# Patient Record
Sex: MALE | Race: BLACK OR AFRICAN AMERICAN | Employment: OTHER | URBAN - METROPOLITAN AREA
[De-identification: names, ages, dates, MRNs, and addresses within clinical notes are randomized per-mention and may not be internally consistent; named-entity substitution may affect disease eponyms.]

---

## 2017-11-15 ENCOUNTER — HOSPITAL ENCOUNTER (EMERGENCY)
Age: 57
Discharge: HOME | End: 2017-11-15
Attending: EMERGENCY MEDICINE | Admitting: EMERGENCY MEDICINE
Payer: OTHER GOVERNMENT

## 2017-11-15 ENCOUNTER — APPOINTMENT (OUTPATIENT)
Dept: GENERAL RADIOLOGY | Age: 57
End: 2017-11-15
Attending: PHYSICIAN ASSISTANT
Payer: OTHER GOVERNMENT

## 2017-11-15 VITALS
HEART RATE: 120 BPM | BODY MASS INDEX: 35.36 KG/M2 | SYSTOLIC BLOOD PRESSURE: 167 MMHG | TEMPERATURE: 101.7 F | OXYGEN SATURATION: 96 % | WEIGHT: 220 LBS | RESPIRATION RATE: 16 BRPM | DIASTOLIC BLOOD PRESSURE: 79 MMHG | HEIGHT: 66 IN

## 2017-11-15 DIAGNOSIS — E11.65 TYPE 2 DIABETES MELLITUS WITH HYPERGLYCEMIA, WITH LONG-TERM CURRENT USE OF INSULIN (HCC): ICD-10-CM

## 2017-11-15 DIAGNOSIS — J18.9 COMMUNITY ACQUIRED PNEUMONIA OF RIGHT LOWER LOBE OF LUNG: Primary | ICD-10-CM

## 2017-11-15 DIAGNOSIS — Z79.4 TYPE 2 DIABETES MELLITUS WITH HYPERGLYCEMIA, WITH LONG-TERM CURRENT USE OF INSULIN (HCC): ICD-10-CM

## 2017-11-15 LAB
ALBUMIN SERPL-MCNC: 3.3 G/DL (ref 3.4–5)
ALBUMIN/GLOB SERPL: 0.8 {RATIO} (ref 0.8–1.7)
ALP SERPL-CCNC: 143 U/L (ref 45–117)
ALT SERPL-CCNC: 50 U/L (ref 16–61)
ANION GAP SERPL CALC-SCNC: 8 MMOL/L (ref 3–18)
AST SERPL-CCNC: 34 U/L (ref 15–37)
ATRIAL RATE: 120 BPM
BASOPHILS # BLD: 0 K/UL (ref 0–0.06)
BASOPHILS NFR BLD: 0 % (ref 0–2)
BILIRUB SERPL-MCNC: 0.8 MG/DL (ref 0.2–1)
BUN SERPL-MCNC: 13 MG/DL (ref 7–18)
BUN/CREAT SERPL: 9 (ref 12–20)
CALCIUM SERPL-MCNC: 8.6 MG/DL (ref 8.5–10.1)
CALCULATED P AXIS, ECG09: 44 DEGREES
CALCULATED R AXIS, ECG10: -16 DEGREES
CALCULATED T AXIS, ECG11: 57 DEGREES
CHLORIDE SERPL-SCNC: 100 MMOL/L (ref 100–108)
CO2 SERPL-SCNC: 29 MMOL/L (ref 21–32)
CREAT SERPL-MCNC: 1.47 MG/DL (ref 0.6–1.3)
DIAGNOSIS, 93000: NORMAL
DIFFERENTIAL METHOD BLD: ABNORMAL
EOSINOPHIL # BLD: 0 K/UL (ref 0–0.4)
EOSINOPHIL NFR BLD: 0 % (ref 0–5)
ERYTHROCYTE [DISTWIDTH] IN BLOOD BY AUTOMATED COUNT: 14 % (ref 11.6–14.5)
GLOBULIN SER CALC-MCNC: 4 G/DL (ref 2–4)
GLUCOSE BLD STRIP.AUTO-MCNC: 236 MG/DL (ref 70–110)
GLUCOSE BLD STRIP.AUTO-MCNC: 296 MG/DL (ref 70–110)
GLUCOSE SERPL-MCNC: 310 MG/DL (ref 74–99)
HCT VFR BLD AUTO: 36.8 % (ref 36–48)
HGB BLD-MCNC: 12.6 G/DL (ref 13–16)
LACTATE SERPL-SCNC: 1.1 MMOL/L (ref 0.4–2)
LACTATE SERPL-SCNC: 2.6 MMOL/L (ref 0.4–2)
LYMPHOCYTES # BLD: 0.2 K/UL (ref 0.9–3.6)
LYMPHOCYTES NFR BLD: 2 % (ref 21–52)
MCH RBC QN AUTO: 26.8 PG (ref 24–34)
MCHC RBC AUTO-ENTMCNC: 34.2 G/DL (ref 31–37)
MCV RBC AUTO: 78.1 FL (ref 74–97)
MONOCYTES # BLD: 0.5 K/UL (ref 0.05–1.2)
MONOCYTES NFR BLD: 5 % (ref 3–10)
MYELOCYTES NFR BLD MANUAL: 1 %
NEUTS BAND NFR BLD MANUAL: 4 %
NEUTS SEG # BLD: 8.7 K/UL (ref 1.8–8)
NEUTS SEG NFR BLD: 88 % (ref 40–73)
P-R INTERVAL, ECG05: 122 MS
PLATELET # BLD AUTO: 257 K/UL (ref 135–420)
PLATELET COMMENTS,PCOM: ABNORMAL
PMV BLD AUTO: 10.8 FL (ref 9.2–11.8)
POTASSIUM SERPL-SCNC: 4 MMOL/L (ref 3.5–5.5)
PROT SERPL-MCNC: 7.3 G/DL (ref 6.4–8.2)
Q-T INTERVAL, ECG07: 336 MS
QRS DURATION, ECG06: 78 MS
QTC CALCULATION (BEZET), ECG08: 474 MS
RBC # BLD AUTO: 4.71 M/UL (ref 4.7–5.5)
RBC MORPH BLD: ABNORMAL
SODIUM SERPL-SCNC: 137 MMOL/L (ref 136–145)
TROPONIN I SERPL-MCNC: <0.02 NG/ML (ref 0–0.06)
VENTRICULAR RATE, ECG03: 120 BPM
WBC # BLD AUTO: 9.9 K/UL (ref 4.6–13.2)

## 2017-11-15 PROCEDURE — 83605 ASSAY OF LACTIC ACID: CPT | Performed by: PHYSICIAN ASSISTANT

## 2017-11-15 PROCEDURE — 85025 COMPLETE CBC W/AUTO DIFF WBC: CPT | Performed by: PHYSICIAN ASSISTANT

## 2017-11-15 PROCEDURE — 82962 GLUCOSE BLOOD TEST: CPT

## 2017-11-15 PROCEDURE — 80053 COMPREHEN METABOLIC PANEL: CPT | Performed by: PHYSICIAN ASSISTANT

## 2017-11-15 PROCEDURE — 74011250636 HC RX REV CODE- 250/636: Performed by: PHYSICIAN ASSISTANT

## 2017-11-15 PROCEDURE — 74011250637 HC RX REV CODE- 250/637: Performed by: PHYSICIAN ASSISTANT

## 2017-11-15 PROCEDURE — 74011000250 HC RX REV CODE- 250: Performed by: PHYSICIAN ASSISTANT

## 2017-11-15 PROCEDURE — 87040 BLOOD CULTURE FOR BACTERIA: CPT | Performed by: PHYSICIAN ASSISTANT

## 2017-11-15 PROCEDURE — 96361 HYDRATE IV INFUSION ADD-ON: CPT

## 2017-11-15 PROCEDURE — 99284 EMERGENCY DEPT VISIT MOD MDM: CPT

## 2017-11-15 PROCEDURE — 84484 ASSAY OF TROPONIN QUANT: CPT | Performed by: PHYSICIAN ASSISTANT

## 2017-11-15 PROCEDURE — 71010 XR CHEST PORT: CPT

## 2017-11-15 PROCEDURE — 93005 ELECTROCARDIOGRAM TRACING: CPT

## 2017-11-15 PROCEDURE — 96375 TX/PRO/DX INJ NEW DRUG ADDON: CPT

## 2017-11-15 PROCEDURE — 96365 THER/PROPH/DIAG IV INF INIT: CPT

## 2017-11-15 RX ORDER — AZITHROMYCIN 250 MG/1
TABLET, FILM COATED ORAL
Qty: 6 TAB | Refills: 0 | Status: SHIPPED | OUTPATIENT
Start: 2017-11-15 | End: 2017-11-20

## 2017-11-15 RX ORDER — IBUPROFEN 400 MG/1
800 TABLET ORAL
Status: COMPLETED | OUTPATIENT
Start: 2017-11-15 | End: 2017-11-15

## 2017-11-15 RX ORDER — ACETAMINOPHEN 325 MG/1
975 TABLET ORAL
Status: COMPLETED | OUTPATIENT
Start: 2017-11-15 | End: 2017-11-15

## 2017-11-15 RX ORDER — ONDANSETRON 2 MG/ML
4 INJECTION INTRAMUSCULAR; INTRAVENOUS
Status: COMPLETED | OUTPATIENT
Start: 2017-11-15 | End: 2017-11-15

## 2017-11-15 RX ADMIN — IBUPROFEN 800 MG: 400 TABLET, FILM COATED ORAL at 10:58

## 2017-11-15 RX ADMIN — SODIUM CHLORIDE 500 MG: 900 INJECTION, SOLUTION INTRAVENOUS at 09:51

## 2017-11-15 RX ADMIN — ONDANSETRON 4 MG: 2 INJECTION INTRAMUSCULAR; INTRAVENOUS at 08:55

## 2017-11-15 RX ADMIN — SODIUM CHLORIDE 1000 ML: 900 INJECTION, SOLUTION INTRAVENOUS at 08:23

## 2017-11-15 RX ADMIN — SODIUM CHLORIDE 2994 ML: 900 INJECTION, SOLUTION INTRAVENOUS at 09:57

## 2017-11-15 RX ADMIN — ACETAMINOPHEN 975 MG: 325 TABLET ORAL at 08:55

## 2017-11-15 RX ADMIN — WATER 1 G: 1 INJECTION INTRAMUSCULAR; INTRAVENOUS; SUBCUTANEOUS at 09:45

## 2017-11-15 NOTE — ED NOTES
Sepsis Screening completed    ( x )Patient meets SIRS criteria. (  )Patient does not meet SIRS criteria.       SIRS Criteria is achieved when two or more of the following are present   Temperature < 96.8°F (36°C) or > 100.9°F (38.3°C)   Heart Rate > 90 beats per minute   Respiratory Rate > 20 breaths per minute   WBC count > 12,000 or <4,000 or > 10% bands

## 2017-11-15 NOTE — ED PROVIDER NOTES
[address]  EMERGENCY DEPARTMENT HISTORY AND PHYSICAL EXAM       Date: 11/15/2017   Patient Name: Merritt Gosselin   YOB: 1960  Medical Record Number: [id number]    History of Presenting Illness     Chief Complaint   Patient presents with    Abdominal Pain    Nausea    Dizziness        History Provided By:  Patient and spouse    Additional History: 8:12 AM  Merritt Gosselin is a 62 y.o. male with PMHx of diabetes and HTN presenting to the ED C/O nausea, vomiting, 2 episodes of nonbloody diarrhea, cough, chest congestion, dizziness with movement, and tactile fever (103 F in ED). Pt states he was dx'ed with vertigo 3-4 months ago at the [address] and is currently taking Meclizine. Pt states he \"had a cold\" and after taking TheraFlu. . Pt states he ate [place] food (chicken) last night. States his daughter had slight abdominal pain after eating it, but no one else experienced these symptoms. Pt reports his granddaughter had a cough last few days and he has been around her. Pt denies bloody/black stool, visual disturbance, HA, sore throat, and any other symptoms or complaints at this time. Primary Care Provider: Elisa Carroll MD   Specialist:    Past History     Past Medical History:   Past Medical History:   Diagnosis Date    Diabetes ([address]     Hypertension         Past Surgical History:   History reviewed. No pertinent surgical history. Family History:   History reviewed. No pertinent family history. Social History:   Social History   Substance Use Topics    Smoking status: None    Smokeless tobacco: None    Alcohol use None        Allergies:   No Known Allergies     Review of Systems   Review of Systems   Constitutional: Positive for fever (tactile). Negative for chills. HENT: Negative for sore throat. Eyes: Negative for visual disturbance. Respiratory: Positive for cough and chest tightness.     Gastrointestinal: Positive for abdominal pain, diarrhea and vomiting. Neurological: Positive for dizziness. Negative for headaches. All other systems reviewed and are negative. Physical Exam  Vitals:    11/15/17 0915 11/15/17 0930 11/15/17 0945 11/15/17 0955   BP: 170/82 174/76 167/79    Pulse:       Resp:       Temp:    (!) 101.7 °F (38.7 °C)   SpO2: 98% 96% 96%    Weight:       Height:           Physical Exam   Nursing note and vitals reviewed. Vital signs and nursing notes reviewed. CONSTITUTIONAL: Alert. Well-appearing; well-nourished; in no apparent distress. HEAD: Normocephalic; atraumatic. EYES: PERRL; EOM's intact. No nystagmus. Conjunctiva clear. ENT: TM's normal. External ear normal. Normal nose; no rhinorrhea. Normal pharynx. Moist mucus membranes. NECK: Supple; FROM without difficulty. CV: Tachycardic, regular S1, S2; no murmurs, rubs, or gallops. No chest wall tenderness. RESPIRATORY: Normal chest excursion with respiration; breath sounds clear and equal bilaterally; no wheezes, rhonchi, or rales. GI: Normal bowel sounds; non-distended; non-tender; no guarding or rigidity; no palpable organomegaly. No CVA tenderness. BACK:  No evidence of trauma or deformity. EXT: Normal ROM in all four extremities; non-tender to palpation. SKIN: Normal for age and race; warm; dry; good turgor; no apparent lesions or exudate. NEURO: A & O x3. Cranial nerves 2-12 intact. Motor 5/5 bilaterally. Sensation intact. PSYCH:  Mood and affect appropriate.        Diagnostic Study Results     Labs -      Recent Results (from the past 12 hour(s))   CBC WITH AUTOMATED DIFF    Collection Time: 11/15/17  8:35 AM   Result Value Ref Range    WBC 9.9 4.6 - 13.2 K/uL    RBC 4.71 4.70 - 5.50 M/uL    HGB 12.6 (L) 13.0 - 16.0 g/dL    HCT 36.8 36.0 - 48.0 %    MCV 78.1 74.0 - 97.0 FL    MCH 26.8 24.0 - 34.0 PG    MCHC 34.2 31.0 - 37.0 g/dL    RDW 14.0 11.6 - 14.5 %    PLATELET 922 468 - 047 K/uL    MPV 10.8 9.2 - 11.8 FL    NEUTROPHILS 88 (H) 40 - 73 %    BAND NEUTROPHILS 4 %    LYMPHOCYTES 2 (L) 21 - 52 %    MONOCYTES 5 3 - 10 %    EOSINOPHILS 0 0 - 5 %    BASOPHILS 0 0 - 2 %    MYELOCYTES 1 %    ABS. NEUTROPHILS 8.7 (H) 1.8 - 8.0 K/UL    ABS. LYMPHOCYTES 0.2 (L) 0.9 - 3.6 K/UL    ABS. MONOCYTES 0.5 0.05 - 1.2 K/UL    ABS. EOSINOPHILS 0.0 0.0 - 0.4 K/UL    ABS. BASOPHILS 0.0 0.0 - 0.06 K/UL    PLATELET COMMENTS LARGE PLATELETS      RBC COMMENTS ANISOCYTOSIS  1+        DF MANUAL     METABOLIC PANEL, COMPREHENSIVE    Collection Time: 11/15/17  8:35 AM   Result Value Ref Range    Sodium 137 136 - 145 mmol/L    Potassium 4.0 3.5 - 5.5 mmol/L    Chloride 100 100 - 108 mmol/L    CO2 29 21 - 32 mmol/L    Anion gap 8 3.0 - 18 mmol/L    Glucose 310 (H) 74 - 99 mg/dL    BUN 13 7.0 - 18 MG/DL    Creatinine 1.47 (H) 0.6 - 1.3 MG/DL    BUN/Creatinine ratio 9 (L) 12 - 20      GFR est AA 60 (L) >60 ml/min/1.73m2    GFR est non-AA 49 (L) >60 ml/min/1.73m2    Calcium 8.6 8.5 - 10.1 MG/DL    Bilirubin, total 0.8 0.2 - 1.0 MG/DL    ALT (SGPT) 50 16 - 61 U/L    AST (SGOT) 34 15 - 37 U/L    Alk.  phosphatase 143 (H) 45 - 117 U/L    Protein, total 7.3 6.4 - 8.2 g/dL    Albumin 3.3 (L) 3.4 - 5.0 g/dL    Globulin 4.0 2.0 - 4.0 g/dL    A-G Ratio 0.8 0.8 - 1.7     TROPONIN I    Collection Time: 11/15/17  8:35 AM   Result Value Ref Range    Troponin-I, Qt. <0.02 0.00 - 0.06 NG/ML   LACTIC ACID    Collection Time: 11/15/17  8:35 AM   Result Value Ref Range    Lactic acid 2.6 (HH) 0.4 - 2.0 MMOL/L   EKG, 12 LEAD, INITIAL    Collection Time: 11/15/17  8:52 AM   Result Value Ref Range    Ventricular Rate 120 BPM    Atrial Rate 120 BPM    P-R Interval 122 ms    QRS Duration 78 ms    Q-T Interval 336 ms    QTC Calculation (Bezet) 474 ms    Calculated P Axis 44 degrees    Calculated R Axis -16 degrees    Calculated T Axis 57 degrees    Diagnosis       Sinus tachycardia  Possible Left atrial enlargement  Nonspecific T wave abnormality  Abnormal ECG  When compared with ECG of 26-FEB-2010 04:27,  No significant change was found     GLUCOSE, POC    Collection Time: 11/15/17  9:00 AM   Result Value Ref Range    Glucose (POC) 296 (H) 70 - 110 mg/dL   LACTIC ACID    Collection Time: 11/15/17 12:33 PM   Result Value Ref Range    Lactic acid 1.1 0.4 - 2.0 MMOL/L   GLUCOSE, POC    Collection Time: 11/15/17  1:38 PM   Result Value Ref Range    Glucose (POC) 236 (H) 70 - 110 mg/dL       Radiologic Studies -  The following have been ordered and reviewed:   XR CHEST PORT   Final Result   Impression:  Underexpanded lungs with asymmetric opacity over the right mid and lower lung  which may represent atelectasis or infiltrate. As read by the radiologist.       Medical Decision Making   I am the first provider for this patient. I reviewed the vital signs, available nursing notes, past medical history, past surgical history, family history and social history. Vital Signs-Reviewed the patient's vital signs. Patient Vitals for the past 12 hrs:   Temp Pulse Resp BP SpO2   11/15/17 0955 (!) 101.7 °F (38.7 °C) - - - -   11/15/17 0945 - - - 167/79 96 %   11/15/17 0930 - - - 174/76 96 %   11/15/17 0915 - - - 170/82 98 %   11/15/17 0908 - - - - 100 %   11/15/17 0845 - - - 152/80 94 %   11/15/17 0839 (!) 103 °F (39.4 °C) (!) 120 16 162/83 99 %   11/15/17 0830 - - - 163/77 97 %   11/15/17 0828 - - - 162/83 -       Pulse Oximetry Analysis - Normal 99% on RA. EKG interpretation: (Preliminary)  Rhythm: Sinus tachycardia. Rate (approx.): 120 bpm; Possible left atrial enlargement. Nonspecific T wave abnormality. EKG read by Marivel Koenig PA-C at 8:5 AM    Old Medical Records: Nursing notes. Procedures:   Procedures    ED Course:  8:12 AM  Initial assessment performed. The patients presenting problems have been discussed, and they are in agreement with the care plan formulated and outlined with them. I have encouraged them to ask questions as they arise throughout their visit.     CONSULT NOTE:   8:52 AM  Lenoria Hamman Pravin Matthew PA-C spoke with Stephanie Cordova MD   Specialty: ED Attending  Discussed pt's hx, disposition, and available diagnostic and imaging results in person. Reviewed care plans. Consulting physician agrees with plan. FACE-TO-FACE PROGRESS NOTE:  10:03 AM  Was requested to see pt by the [facility]. Evaluated pt face-to-face. 2 days cough, NV/ fever. Improving with IV fluids. Small PNA on CXR. Will re-evaluation after tx for OP vs. IP pt of community acquired PNA. Written by Jay Brannon, ED Scribe, as dictated by Stephanie Cordova MD.     PROGRESS NOTE:   10:57 AM  Pt has been re-examined by Waylon Capellan PA-C. Pt is feeling \"alright. \" Resting conformably. His fever has improved from 103F to 100.5F. O2 stats 96% on RA. Still tachycardic at 114 bpm. Respiratory rate is 16. Most recent b/p is 145/74. He has completed 2 L of normal saline. His 30 cc/kg target fluid equals just under 3 L's. PROGRESS NOTE:   1:28 PM  Pt has been re-examined by Waylon Capellan PA-C. Pt states he is feeling much better. Feels like himself. States he wants to go home. His repeat lactic acid is 1.1. His CURB score is 0; low risk. He has good follow up through the [place] and his wife will be with him. He is afebrile. O2 stats 97% on RA. Still mildly tachycardic, not hypoxic or tachypnic. Blood pressures have been fine while in ED; not hypotensive. Risk and benefits of admission discussed but he looks well, is reliable and wants to go home. Will discharge with antibiotics and strict return precautions.      Medications Given in the ED:  Medications   sodium chloride 0.9 % bolus infusion 1,000 mL (0 mL IntraVENous IV Completed 11/15/17 1003)   ondansetron (ZOFRAN) injection 4 mg (4 mg IntraVENous Given 11/15/17 0855)   acetaminophen (TYLENOL) tablet 975 mg (975 mg Oral Given 11/15/17 0855)   azithromycin (ZITHROMAX) 500 mg in 0.9% sodium chloride (MBP/ADV) 250 mL adv (0 mg IntraVENous IV Completed 11/15/17 1051)   sodium chloride 0.9 % bolus infusion 2,994 mL (0 mL/kg × 99.8 kg IntraVENous IV Completed 11/15/17 1247)   cefTRIAXone (ROCEPHIN) 1 g in sterile water (preservative free) 10 mL IV syringe (1 g IntraVENous Given 11/15/17 0718)   ibuprofen (MOTRIN) tablet 800 mg (800 mg Oral Given 11/15/17 1058)       Discharge Note:  1:33 PM  Pt has been reexamined. Patient has no new complaints, changes, or physical findings. Care plan outlined and precautions discussed. Results were reviewed with the patient. All medications were reviewed with the patient; will d/c home with Zithromax Z-Antwan. All of pt's questions and concerns were addressed. Patient was instructed and agrees to follow up with [address] [place] [address], as well as to return to the ED upon further deterioration. Patient is ready to go home. Diagnosis   Clinical Impression:   1. Community acquired pneumonia of right lower lobe of lung ([facility] [address] [facility]    2. Type 2 diabetes mellitus with hyperglycemia, with long-term current use of insulin ([facility] [address] [facility]         Follow-up Information     Follow up With Details Comments [address] [place] [address] Schedule an appointment as soon as possible for a visit in 3 days For primary care follow up. [facility] [address] [place] [address] EMERGENCY DEPT Go to As needed, If symptoms worsen [address] [id number]  [phone]          Discharge Medication List as of 11/15/2017  1:33 PM      START taking these medications    Details   azithromycin (ZITHROMAX Z-ANTWAN) 250 mg tablet Use per pack instructions. , Print, Disp-6 Tab, R-0         CONTINUE these medications which have NOT CHANGED    Details   LISINOPRIL PO Take  by mouth. Pt did not bring med list or bottles, Historical Med      INSULIN ASPART (NOVOLOG SC) by SubCUTAneous route. Pt did not bring med list or bottles with him, Historical Med      INSULIN GLARGINE,HUM. REC. ANLOG (LANTUS SC) by SubCUTAneous route.  Pt did not bring med list or bottles with him, Historical Med             Critical Care Time:  I have spent 35 minutes of critical care time involved in lab review, consultations with specialist, family decision-making, and documentation. During this entire length of time I was immediately available to the patient. Critical Care: The reason for providing this level of medical care for this critically ill patient was due a critical illness that impaired one or more vital organ systems such that there was a high probability of imminent or life threatening deterioration in the patients condition. This care involved high complexity decision making to assess, manipulate, and support vital system functions, to treat this degreee vital organ system failure and to prevent further life threatening deterioration of the patients condition. _______________________________   Attestations: This note is prepared by Will Orellana, acting as a Scribe for [employer] Espinela on 8:07 AM on 11/15/2017. [employer], PA-C: The scribe's documentation has been prepared under my direction and personally reviewed by me in its entirety. This note is prepared by Tiff Bean, acting as a Scribe for Kim Day MD on 10:15 AM on 11/15/2017. Kim Day MD: The scribe's documentation has been prepared under my direction and personally reviewed by me in its entirety.   _______________________________

## 2017-11-15 NOTE — DISCHARGE INSTRUCTIONS

## 2017-11-15 NOTE — ED TRIAGE NOTES
Pt states started having abdominal pain, nausea, vomiting, and dizziness last night;  Has headache also;

## 2017-11-21 LAB
BACTERIA SPEC CULT: NORMAL
SERVICE CMNT-IMP: NORMAL

## 2019-03-06 ENCOUNTER — HOSPITAL ENCOUNTER (EMERGENCY)
Age: 59
Discharge: HOME | End: 2019-03-06
Attending: EMERGENCY MEDICINE | Admitting: EMERGENCY MEDICINE
Payer: OTHER GOVERNMENT

## 2019-03-06 ENCOUNTER — APPOINTMENT (OUTPATIENT)
Dept: GENERAL RADIOLOGY | Age: 59
End: 2019-03-06
Attending: EMERGENCY MEDICINE
Payer: OTHER GOVERNMENT

## 2019-03-06 VITALS
TEMPERATURE: 98.1 F | HEART RATE: 101 BPM | HEIGHT: 67 IN | SYSTOLIC BLOOD PRESSURE: 110 MMHG | BODY MASS INDEX: 37.67 KG/M2 | DIASTOLIC BLOOD PRESSURE: 67 MMHG | WEIGHT: 240 LBS | OXYGEN SATURATION: 98 % | RESPIRATION RATE: 16 BRPM

## 2019-03-06 DIAGNOSIS — R07.89 ATYPICAL CHEST PAIN: Primary | ICD-10-CM

## 2019-03-06 DIAGNOSIS — R11.10 NON-INTRACTABLE VOMITING, PRESENCE OF NAUSEA NOT SPECIFIED, UNSPECIFIED VOMITING TYPE: ICD-10-CM

## 2019-03-06 LAB
ALBUMIN SERPL-MCNC: 4.1 G/DL (ref 3.4–5)
ALBUMIN/GLOB SERPL: 1.1 {RATIO} (ref 0.8–1.7)
ALP SERPL-CCNC: 144 U/L (ref 45–117)
ALT SERPL-CCNC: 34 U/L (ref 16–61)
ANION GAP SERPL CALC-SCNC: 12 MMOL/L (ref 3–18)
AST SERPL-CCNC: 23 U/L (ref 15–37)
BASOPHILS # BLD: 0 K/UL (ref 0–0.1)
BASOPHILS NFR BLD: 0 % (ref 0–2)
BILIRUB SERPL-MCNC: 0.7 MG/DL (ref 0.2–1)
BUN SERPL-MCNC: 26 MG/DL (ref 7–18)
BUN/CREAT SERPL: 17 (ref 12–20)
CALCIUM SERPL-MCNC: 9.4 MG/DL (ref 8.5–10.1)
CHLORIDE SERPL-SCNC: 102 MMOL/L (ref 100–108)
CK MB CFR SERPL CALC: 0.5 % (ref 0–4)
CK MB SERPL-MCNC: 1.1 NG/ML (ref 5–25)
CK SERPL-CCNC: 227 U/L (ref 39–308)
CO2 SERPL-SCNC: 25 MMOL/L (ref 21–32)
CREAT SERPL-MCNC: 1.49 MG/DL (ref 0.6–1.3)
DIFFERENTIAL METHOD BLD: ABNORMAL
EOSINOPHIL # BLD: 0 K/UL (ref 0–0.4)
EOSINOPHIL NFR BLD: 0 % (ref 0–5)
ERYTHROCYTE [DISTWIDTH] IN BLOOD BY AUTOMATED COUNT: 13.8 % (ref 11.6–14.5)
GLOBULIN SER CALC-MCNC: 3.7 G/DL (ref 2–4)
GLUCOSE SERPL-MCNC: 284 MG/DL (ref 74–99)
HCT VFR BLD AUTO: 35.4 % (ref 36–48)
HGB BLD-MCNC: 11.6 G/DL (ref 13–16)
LIPASE SERPL-CCNC: 86 U/L (ref 73–393)
LYMPHOCYTES # BLD: 0.4 K/UL (ref 0.9–3.6)
LYMPHOCYTES NFR BLD: 4 % (ref 21–52)
MCH RBC QN AUTO: 26.5 PG (ref 24–34)
MCHC RBC AUTO-ENTMCNC: 32.8 G/DL (ref 31–37)
MCV RBC AUTO: 81 FL (ref 74–97)
MONOCYTES # BLD: 0.2 K/UL (ref 0.05–1.2)
MONOCYTES NFR BLD: 2 % (ref 3–10)
NEUTS SEG # BLD: 10.1 K/UL (ref 1.8–8)
NEUTS SEG NFR BLD: 94 % (ref 40–73)
PLATELET # BLD AUTO: 254 K/UL (ref 135–420)
PMV BLD AUTO: 10.8 FL (ref 9.2–11.8)
POTASSIUM SERPL-SCNC: 4.2 MMOL/L (ref 3.5–5.5)
PROT SERPL-MCNC: 7.8 G/DL (ref 6.4–8.2)
RBC # BLD AUTO: 4.37 M/UL (ref 4.7–5.5)
SODIUM SERPL-SCNC: 139 MMOL/L (ref 136–145)
TROPONIN I SERPL-MCNC: <0.02 NG/ML (ref 0–0.04)
TROPONIN I SERPL-MCNC: <0.02 NG/ML (ref 0–0.04)
WBC # BLD AUTO: 10.7 K/UL (ref 4.6–13.2)

## 2019-03-06 PROCEDURE — 74011000250 HC RX REV CODE- 250: Performed by: EMERGENCY MEDICINE

## 2019-03-06 PROCEDURE — 82550 ASSAY OF CK (CPK): CPT

## 2019-03-06 PROCEDURE — 71045 X-RAY EXAM CHEST 1 VIEW: CPT

## 2019-03-06 PROCEDURE — 83690 ASSAY OF LIPASE: CPT

## 2019-03-06 PROCEDURE — 93005 ELECTROCARDIOGRAM TRACING: CPT

## 2019-03-06 PROCEDURE — 74011250637 HC RX REV CODE- 250/637: Performed by: EMERGENCY MEDICINE

## 2019-03-06 PROCEDURE — 85025 COMPLETE CBC W/AUTO DIFF WBC: CPT

## 2019-03-06 PROCEDURE — 80053 COMPREHEN METABOLIC PANEL: CPT

## 2019-03-06 PROCEDURE — 96375 TX/PRO/DX INJ NEW DRUG ADDON: CPT

## 2019-03-06 PROCEDURE — 74011000258 HC RX REV CODE- 258: Performed by: EMERGENCY MEDICINE

## 2019-03-06 PROCEDURE — 74011250636 HC RX REV CODE- 250/636: Performed by: EMERGENCY MEDICINE

## 2019-03-06 PROCEDURE — 99285 EMERGENCY DEPT VISIT HI MDM: CPT

## 2019-03-06 PROCEDURE — 96374 THER/PROPH/DIAG INJ IV PUSH: CPT

## 2019-03-06 RX ORDER — ONDANSETRON 4 MG/1
4 TABLET, ORALLY DISINTEGRATING ORAL
Qty: 6 TAB | Refills: 0 | Status: SHIPPED | OUTPATIENT
Start: 2019-03-06

## 2019-03-06 RX ORDER — FAMOTIDINE 10 MG/ML
20 INJECTION INTRAVENOUS
Status: COMPLETED | OUTPATIENT
Start: 2019-03-06 | End: 2019-03-06

## 2019-03-06 RX ORDER — DICYCLOMINE HYDROCHLORIDE 10 MG/1
10 CAPSULE ORAL
Qty: 20 CAP | Refills: 0 | Status: SHIPPED | OUTPATIENT
Start: 2019-03-06 | End: 2019-03-09

## 2019-03-06 RX ORDER — MORPHINE SULFATE 4 MG/ML
4 INJECTION INTRAVENOUS
Status: COMPLETED | OUTPATIENT
Start: 2019-03-06 | End: 2019-03-06

## 2019-03-06 RX ORDER — OMEPRAZOLE 20 MG/1
20 CAPSULE, DELAYED RELEASE ORAL DAILY
Qty: 20 CAP | Refills: 0 | Status: SHIPPED | OUTPATIENT
Start: 2019-03-06

## 2019-03-06 RX ADMIN — MORPHINE SULFATE 4 MG: 4 INJECTION INTRAVENOUS at 06:30

## 2019-03-06 RX ADMIN — LIDOCAINE HYDROCHLORIDE 40 ML: 20 SOLUTION ORAL; TOPICAL at 07:47

## 2019-03-06 RX ADMIN — PROMETHAZINE HYDROCHLORIDE 12.5 MG: 25 INJECTION, SOLUTION INTRAMUSCULAR; INTRAVENOUS at 06:26

## 2019-03-06 RX ADMIN — FAMOTIDINE 20 MG: 10 INJECTION, SOLUTION INTRAVENOUS at 06:26

## 2019-03-06 NOTE — ED PROVIDER NOTES
EMERGENCY DEPARTMENT HISTORY AND PHYSICAL EXAM    Date: 3/6/2019  Patient Name: Ariel Reynolds    History of Presenting Illness     Chief Complaint   Patient presents with    Nausea    Vomiting    Chest Pain         History Provided By: Patient    Chief Complaint: Chest pain  Duration: 7 hours   Timing:  Constant  Location: Chest  Severity: 7 out of 10  Modifying Factors: Pt received Zofran 4 mg by EMS without relief. Associated Symptoms: nausea, vomiting, diarrhea x 6 episodes    Additional History (Context):   6:02 AM  Ariel Reynolds is a 62 y.o. male with PMHx of DM and HTN who presents via EMS to the emergency department C/O constant chest pain (rated 7/10), onset 7 hours ago. Associated sxs include nausea, vomiting, diarrhea x 6 episodes. Pt received Zofran 4 mg by EMS without relief. BGL en route was 239. Notes having similar pain and stress test many years ago. No FHx of MI. Pt denies h/o adbominal surgeries, or any other sxs or complaints. PCP: Carly, MD Elisa    Current Outpatient Medications   Medication Sig Dispense Refill    ondansetron (ZOFRAN ODT) 4 mg disintegrating tablet Take 1 Tab by mouth every eight (8) hours as needed for Nausea. 6 Tab 0    omeprazole (PRILOSEC) 20 mg capsule Take 1 Cap by mouth daily. 20 Cap 0    dicyclomine (BENTYL) 10 mg capsule Take 1 Cap by mouth three (3) times daily as needed (abd cramps) for up to 3 days. 20 Cap 0    LISINOPRIL PO Take  by mouth. Pt did not bring med list or bottles      INSULIN ASPART (NOVOLOG SC) by SubCUTAneous route. Pt did not bring med list or bottles with him      INSULIN GLARGINE,HUM. REC. ANLOG (LANTUS SC) by SubCUTAneous route. Pt did not bring med list or bottles with him         Past History     Past Medical History:  Past Medical History:   Diagnosis Date    Diabetes ([facility] [address]     Hypertension        Past Surgical History:  History reviewed. No pertinent surgical history. Family History:  History reviewed.  No pertinent family history. Social History:  Social History     Tobacco Use    Smoking status: Never Smoker    Smokeless tobacco: Never Used   Substance Use Topics    Alcohol use: Not on file    Drug use: No       Allergies:  No Known Allergies      Review of Systems   Review of Systems   Cardiovascular: Positive for chest pain. Gastrointestinal: Positive for diarrhea, nausea and vomiting. All other systems reviewed and are negative. Physical Exam     Vitals:    03/06/19 0630 03/06/19 0700 03/06/19 0730 03/06/19 0800   BP: 160/83 142/83  110/67   Pulse: 93 92  (!) 101   Resp: 18 16     Temp:       SpO2: 100% 95% 98% 98%   Weight:       Height:         Physical Exam   Constitutional: He is oriented to person, place, and time. He appears well-developed. No distress. Obese male in no distress   HENT:   Head: Normocephalic and atraumatic. Eyes: Pupils are equal, round, and reactive to light. Neck: Neck supple. Cardiovascular: Normal rate, regular rhythm, S1 normal, S2 normal and normal heart sounds. Pulmonary/Chest: Breath sounds normal. No respiratory distress. He has no wheezes. He has no rales. He exhibits no tenderness. Abdominal: Soft. He exhibits no distension and no mass. There is tenderness (minimal). There is no guarding. Musculoskeletal: Normal range of motion. He exhibits no edema or tenderness. Neurological: He is alert and oriented to person, place, and time. No cranial nerve deficit. Skin: No rash noted. Psychiatric: He has a normal mood and affect. His behavior is normal. Thought content normal.   Nursing note and vitals reviewed.     Diagnostic Study Results     Labs -     Recent Results (from the past 12 hour(s))   EKG, 12 LEAD, INITIAL    Collection Time: 03/06/19  6:06 AM   Result Value Ref Range    Ventricular Rate 83 BPM    Atrial Rate 83 BPM    P-R Interval 134 ms    QRS Duration 86 ms    Q-T Interval 408 ms    QTC Calculation (Bezet) 479 ms    Calculated P Axis 68 degrees Calculated R Axis -7 degrees    Calculated T Axis 55 degrees    Diagnosis       Normal sinus rhythm  Prolonged QT  Abnormal ECG  When compared with ECG of 15-NOV-2017 08:52,  ST no longer depressed in Lateral leads  T wave inversion no longer evident in Lateral leads     CBC WITH AUTOMATED DIFF    Collection Time: 03/06/19  6:15 AM   Result Value Ref Range    WBC 10.7 4.6 - 13.2 K/uL    RBC 4.37 (L) 4.70 - 5.50 M/uL    HGB 11.6 (L) 13.0 - 16.0 g/dL    HCT 35.4 (L) 36.0 - 48.0 %    MCV 81.0 74.0 - 97.0 FL    MCH 26.5 24.0 - 34.0 PG    MCHC 32.8 31.0 - 37.0 g/dL    RDW 13.8 11.6 - 14.5 %    PLATELET 233 828 - 835 K/uL    MPV 10.8 9.2 - 11.8 FL    NEUTROPHILS 94 (H) 40 - 73 %    LYMPHOCYTES 4 (L) 21 - 52 %    MONOCYTES 2 (L) 3 - 10 %    EOSINOPHILS 0 0 - 5 %    BASOPHILS 0 0 - 2 %    ABS. NEUTROPHILS 10.1 (H) 1.8 - 8.0 K/UL    ABS. LYMPHOCYTES 0.4 (L) 0.9 - 3.6 K/UL    ABS. MONOCYTES 0.2 0.05 - 1.2 K/UL    ABS. EOSINOPHILS 0.0 0.0 - 0.4 K/UL    ABS. BASOPHILS 0.0 0.0 - 0.1 K/UL    DF AUTOMATED     METABOLIC PANEL, COMPREHENSIVE    Collection Time: 03/06/19  6:15 AM   Result Value Ref Range    Sodium 139 136 - 145 mmol/L    Potassium 4.2 3.5 - 5.5 mmol/L    Chloride 102 100 - 108 mmol/L    CO2 25 21 - 32 mmol/L    Anion gap 12 3.0 - 18 mmol/L    Glucose 284 (H) 74 - 99 mg/dL    BUN 26 (H) 7.0 - 18 MG/DL    Creatinine 1.49 (H) 0.6 - 1.3 MG/DL    BUN/Creatinine ratio 17 12 - 20      GFR est AA 59 (L) >60 ml/min/1.73m2    GFR est non-AA 48 (L) >60 ml/min/1.73m2    Calcium 9.4 8.5 - 10.1 MG/DL    Bilirubin, total 0.7 0.2 - 1.0 MG/DL    ALT (SGPT) 34 16 - 61 U/L    AST (SGOT) 23 15 - 37 U/L    Alk.  phosphatase 144 (H) 45 - 117 U/L    Protein, total 7.8 6.4 - 8.2 g/dL    Albumin 4.1 3.4 - 5.0 g/dL    Globulin 3.7 2.0 - 4.0 g/dL    A-G Ratio 1.1 0.8 - 1.7     LIPASE    Collection Time: 03/06/19  6:15 AM   Result Value Ref Range    Lipase 86 73 - 393 U/L   CARDIAC PANEL,(CK, CKMB & TROPONIN)    Collection Time: 03/06/19  6:15 AM   Result Value Ref Range     39 - 308 U/L    CK - MB 1.1 <3.6 ng/ml    CK-MB Index 0.5 0.0 - 4.0 %    Troponin-I, QT <0.02 0.0 - 0.045 NG/ML   TROPONIN I    Collection Time: 03/06/19  7:51 AM   Result Value Ref Range    Troponin-I, QT <0.02 0.0 - 0.045 NG/ML       Radiologic Studies -    XR CHEST PORT   Final Result   IMPRESSION:      No acute findings in the chest with clearance of previously noted airspace   disease in the right mid and lower lung. As read by the radiologist.         Medical Decision Making   I am the first provider for this patient. I reviewed the vital signs, available nursing notes, past medical history, past surgical history, family history and social history. Vital Signs-Reviewed the patient's vital signs. Pulse Oximetry Analysis - 100% on room air     Cardiac Monitor:  Rate: 85 bpm  Rhythm: NSR    EKG interpretation: (Preliminary)  6:06 AM  NSR at 83 bpm. Prolonged QT. EKG read by Sandra Pichardo MD     Records Reviewed: Nursing Notes and Old Medical Records    Provider Notes (Medical Decision Making):   INITIAL CLINICAL IMPRESSION and PLANS:  The patient presents with the primary complaint(s) of: chest pain. The presentation, to include historical aspects and clinical findings are consistent with the DX of chest pain. However, other possible DX's to consider as primary, associated with, or exacerbated by include:    1. Acute MI  2. ACS  3. PE  4. Dissection  5. Pericarditis  6. PNA  7. GERD  8. GI  9. Psych  10.  Chest wall pain    Procedures:  Procedures    MEDICATIONS GIVEN:  Medications   promethazine (PHENERGAN) 12.5 mg in 0.9% sodium chloride 50 mL IVPB (0 mg IntraVENous IV Completed 3/6/19 0641)   famotidine (PF) (PEPCID) injection 20 mg (20 mg IntraVENous Given 3/6/19 0626)   morphine injection 4 mg (4 mg IntraVENous Given 3/6/19 0630)   mylanta/viscous lidocaine (GI COCKTAIL) (40 mL Oral Given 3/6/19 8963)       ED Course:   6:02 AM   Initial assessment performed. The patients presenting problems have been discussed, and they are in agreement with the care plan formulated and outlined with them. I have encouraged them to ask questions as they arise throughout their visit. Sign Out:  7:00 AM  Patient's presentation, labs/imaging and plan of care was reviewed with Bhavana Melendez MD as part of sign out. They will await troponin and if negative discharge as part of the plan discussed with the patient. Bhavana Melendez MD's assistance in completion of this plan is greatly appreciated but it should be noted that I will be the provider of record for this patient. Written by Diego Burnett ED Scribe, as dictated by Fredy Vidal MD.     Diagnosis and Disposition     DISCHARGE NOTE:  8:33 AM  Archana Tomlinson's  results have been reviewed with him. He has been counseled regarding his diagnosis, treatment, and plan. He verbally conveys understanding and agreement of the signs, symptoms, diagnosis, treatment and prognosis and additionally agrees to follow up as discussed. He also agrees with the care-plan and conveys that all of his questions have been answered. I have also provided discharge instructions for him that include: educational information regarding their diagnosis and treatment, and list of reasons why they would want to return to the ED prior to their follow-up appointment, should his condition change. He has been provided with education for proper emergency department utilization. CLINICAL IMPRESSION:    1. Atypical chest pain    2. Non-intractable vomiting, presence of nausea not specified, unspecified vomiting type        PLAN:  1. D/C Home  2. Current Discharge Medication List      START taking these medications    Details   ondansetron (ZOFRAN ODT) 4 mg disintegrating tablet Take 1 Tab by mouth every eight (8) hours as needed for Nausea.   Qty: 6 Tab, Refills: 0      omeprazole (PRILOSEC) 20 mg capsule Take 1 Cap by mouth daily.  Qty: 20 Cap, Refills: 0      dicyclomine (BENTYL) 10 mg capsule Take 1 Cap by mouth three (3) times daily as needed (abd cramps) for up to 3 days. Qty: 20 Cap, Refills: 0           3. Follow-up Information     Follow up With Specialties Details Why [address]    [facility] EM[facility] Emergency Medicine  As needed [address] Monster Schreiber  [phone]    Paul Fernandes MD Cardiology   [facility] [address]  [address] [place] [address]  [phone]          _______________________________    Attestations: This note is prepared by Tasha Langley, acting as Scribe for Berenice Heller MD.    Berenice Heller MD:  The scribe's documentation has been prepared under my direction and personally reviewed by me in its entirety. I confirm that the note above accurately reflects all work, treatment, procedures, and medical decision making performed by me. This note is prepared by Karolina Price, acting as Scribe for Lupis aJmeson MD.    Lupis Jameson MD:  The scribe's documentation has been prepared under my direction and personally reviewed by me in its entirety.   I confirm that the note above accurately reflects all work, treatment, procedures, and medical decision making performed by me.  _______________________________

## 2019-03-06 NOTE — DISCHARGE INSTRUCTIONS

## 2019-03-06 NOTE — ED NOTES
Report received from DONAL PRABHAKAR [facility]. Included SBAR and MAR    Pt hourly rounding competed. Safety   Pt (x) resting on stretcher with side rails up and call bell in reach. () in chair    () in parents arms. Toileting   Pt offered ()Bedpan     ()Assistance to Restroom     ()Urinal  Ongoing Updates  Updated on plan of care and status of test results. Pain Management  Inquired as to comfort and offered comfort measures:    () warm blankets   () dimmed lights    Wife remains at bedside.

## 2019-03-06 NOTE — ED TRIAGE NOTES
Pt arrives via ems stretcher with c\o CP, n/v x 7 hours PTA, pt recvd 4mg zofran IV en route, pt , pt is able to make needs known speaking in complete sentences, pt in nad at this time

## 2019-05-14 LAB
ATRIAL RATE: 83 BPM
CALCULATED P AXIS, ECG09: 68 DEGREES
CALCULATED R AXIS, ECG10: -7 DEGREES
CALCULATED T AXIS, ECG11: 55 DEGREES
DIAGNOSIS, 93000: NORMAL
P-R INTERVAL, ECG05: 134 MS
Q-T INTERVAL, ECG07: 408 MS
QRS DURATION, ECG06: 86 MS
QTC CALCULATION (BEZET), ECG08: 479 MS
VENTRICULAR RATE, ECG03: 83 BPM

## 2019-10-04 NOTE — LETTER
[facility] [place] 
[facility] 
[address] [id number] 
[phone] Work/School Note Date: 3/6/2019 To Whom It May concern: 
 
Neel Chappell was seen and treated today in the emergency room by the following provider(s): 
Attending Providers: MD Alvin Spears MD.   
 
 
Sincerely, 
 
 
 
 
Alvin Calderon MD 
 
 
 

POST-OP DIAGNOSIS:  Diabetic Charcot's foot 04-Oct-2019 17:55:32  Antonio Kelley